# Patient Record
(demographics unavailable — no encounter records)

---

## 2024-12-02 NOTE — REASON FOR VISIT
[Initial Visit] : an initial visit for [Other: ______] : provided by ADRIÁN [Time Spent: ____ minutes] : Total time spent using  services: [unfilled] minutes. The patient's primary language is not English thus required  services. [FreeTextEntry2] : Left shoulder pain  [Interpreters_IDNumber] : [id number] [Interpreters_FullName] : Stefan [TWNoteComboBox1] : Tuvaluan

## 2024-12-02 NOTE — HISTORY OF PRESENT ILLNESS
[de-identified] : 12/2/2024: Elmira is a pleasant 45-year-old right-hand-dominant Persian-speaking female presents the office today for evaluation of left shoulder pain.  The patient states that her pain began about 10 months ago but she denies a history of injury.  The pain is activity related relieved by rest.  Hurts to reach overhead or behind.  Bothers her the lateral side at night.  She has not had any formal treatment before.  The patient denies any fevers, chills, sweats, recent illnesses, numbness, tingling, weakness, or pain elsewhere at this time.

## 2024-12-02 NOTE — DISCUSSION/SUMMARY
[de-identified] : Assessment: 45-year-old female with left shoulder pain secondary to impingement and rotator cuff tendinitis  Plan: I had a long discussion with the patient today regarding the nature of their diagnosis and treatment plan. We discussed the risks and benefits of no treatment as well as nonoperative and operative treatments.  I reviewed the patient's x-rays today with her in the office which demonstrate a type III acromion in the absence of any acute pathology.  At this time I recommend conservative treatment of the patient's condition with modalities including rest, ice, heat, anti-inflammatory medications, activity modifications, and home stretching and strengthening exercises.  A prescription for meloxicam was sent to the patient's pharmacy today.  I discussed with the patient the risks and benefits associated with NSAID use. GI precautions were discussed.  A referral for physical therapy was provided to begin working on exercises to help improve their strength and function.  Recommend follow-up in 8 weeks for repeat clinical assessment.  If symptoms persist we may consider an MRI versus an injection.  The patient verbalizes their understanding and agrees with the plan.  All questions were answered to their satisfaction.   I, Dr. Cook, personally performed the evaluation and management (E/M) services for this new patient.  That E/M includes conducting the clinically appropriate initial history &/or exam, assessing all conditions, and establishing the plan of care.  Today, my TAMELA, was here to observe my evaluation and management service for this patient & follow plan of care established by me going forward.

## 2024-12-02 NOTE — PHYSICAL EXAM
[de-identified] : General: Awake, alert, no acute distress, Patient was cooperative and appropriate during the examination.  Walks without an antalgic gait.   Left shoulder Exam: Physical exam of the shoulder demonstrates normal skin without signs of skin changes or abnormalities. No erythema, warmth, or joint effusion appreciated.  Sensation intact light touch C5-T1 Palpable radial pulse Radial/ulnar/median/axillary/musculocutaneous/AIN/PIN nerves grossly intact  Range of motion: Forward Flexion: 175 Abduction: 140 External Rotation: 45 Internal Rotation: L3  Palpation: Not tender to palpation over the glenohumeral joint Moderately tender palpation over the rotator cuff insertion on the greater tuberosity Not tender to palpation over the AC joint Mildly tender to palpation of the biceps tendon/bicipital groove  Strength testing: Supraspinatus: 5/5 Infraspinatus: 5/5 Subscapularis: 5/5  Special test: Empty can test positive Holland impingement test positive Speeds test negative Trumbull's test negative Lift-off test negative Bell-press test negative Cross-arm adduction test negative   [de-identified] : X-rays including 4 views of the left shoulder were obtained in the office on 12/2/2024 and reviewed with the patient.  No acute fracture or dislocation.  No significant arthritis.  Type III acromion.

## 2025-01-07 NOTE — ASSESSMENT
[FreeTextEntry1] : 45-year-old female with lumbar spondylosis lumbar radiculopathy   Medrol Dosepak Gabapentin 300 mg at nighttime Physical therapy*'s  service referral was placed to assist with scheduling location I will see her back in 6 weeks if her symptoms not improved we will move forward a lumbar MRI

## 2025-01-07 NOTE — HISTORY OF PRESENT ILLNESS
[de-identified] : Chief Complaint: Right-sided low back and right leg pain   History of Present Illness: 45-year-old female Maltese-speaking  is used today to assist interpretation she is coming in complaining of 6-month history of on and off right-sided low back and right leg pain.  She states it came on insidiously she having severe stabbing pain in the right side of her low back will radiate down the posterior aspect of her leg down to the foot and L5/S1 distribution.  She states the pain down the leg is intermittent and the pain in the back is intermittent she gets a flareup every 2 to 3 weeks she is currently having severe pain she states 10 out of 10 in severity.  She has difficulty and exacerbation of her pain with increased activity walking and lifting.  She denies any numbness or tingling.  She states the pain is keeping her up at nighttime.  She has tried some Advil with limited relief of her symptoms.   Past medical history, past surgical history, medications, allergies, social history, and family history are as documented in our records today.  Notable items include: None   Review of Systems: I have reviewed the patient's documented Review of Systems data today, I concur with this documentation.

## 2025-01-07 NOTE — PHYSICAL EXAM
[de-identified] : CONSTITUTIONAL: Patient is a very pleasant individual who is well-nourished and appears stated age. PSYCHIATRIC: Alert and oriented times three and in no apparent distress, and participates with orthopedic evaluation well. HEAD: Atraumatic and nonsyndromic in appearance. EENT: No thyromegaly, EOMI. RESPIRATORY: Respiratory rate is regular, not dyspneic on examination. LYMPHATICS: There is no cervical or axillary lymphadenopathy. INTEGUMENTARY: Skin is clean, dry, and intact to bilateral lower extremities. VASCULAR: There is brisk capillary refill about the bilateral Lower Extremities with 2+ DP Pulse  Palpation: There is significant paraspinal tenderness right side lumbosacral spine No pain with internal/external range of motion bilateral hips  Muscle Strength Testing: Hip Flexion: 5/5 B/L Knee Extension: 5/5 B/L Knee Flexion: 5/5 B/L Dorsiflexion: 5/5 B/L EHL: 5/5 B/L Plantarflexion: 5/5 B/L  Sensation: SILT L2-S1 B/L except: None  Reflexes: 2+ Quadriceps/Achilles  Gait: Normal gait w/o assistance Able to perform tandem gait Able to Heel Walk Able to Toe Walk  Special Testing:  Grossly positive straight leg raise test right lower extremity Negative clonus BLLE  [de-identified] : Standing AP, lateral, flexion and extension radiographs of the lumbar spine performed on 1/7/2025 in the Radiology Department at [facility] [place] for the indication of low back pain are reviewed.  These studies demonstrate no deformity on AP film no osteoarthritis bilateral hips Lateral radiographs demonstrates transitional anatomy rudimentary disc S1-S2 there is signs of mild spondylosis and disc height loss at L5-S1

## 2025-04-14 NOTE — REASON FOR VISIT
[Family Member] : family member [Initial Visit] : an initial visit for [Other: ______] : provided by ADRIÁN [FreeTextEntry2] : Left shoulder pain  [Interpreters_IDNumber] : [id number] [Interpreters_FullName] : Stefan [TWNoteComboBox1] : Vietnamese

## 2025-04-14 NOTE — DISCUSSION/SUMMARY
[de-identified] : Assessment: 45-year-old female with bilateral shoulder pain secondary to impingement and rotator cuff tendinitis, left worse than right  Plan: I had a long discussion with the patient today regarding the nature of their diagnosis and treatment plan. We discussed the risks and benefits of no treatment as well as nonoperative and operative treatments.  I reviewed the patient's x-rays today with her in the office which demonstrate a type III acromion in the absence of any acute pathology.  At this time I recommend continued conservative treatment of the patient's condition with modalities including rest, ice, heat, anti-inflammatory medications, activity modifications, and home stretching and strengthening exercises.  A prescription for nabumetone to be taken twice daily with food for pain inflammation was sent to the pharmacy. The risks and benefits associated with NSAID use. GI precautions were discussed.  Also due to the lack of improvement with conservative treatment including physical therapy, anti-inflammatories over the last several months I am recommending an MRI of the left shoulder for further evaluation.  She will follow-up after the MRI is complete for repeat evaluation and further discussion.  We may consider injection versus surgery pending the results.  We also may consider further imaging such as x-ray or MRI of the right if symptoms were to persist despite continued conservative treatment.  The patient verbalizes their understanding and agrees with the plan.  All questions were answered to their satisfaction.   I, Dr. Cook, personally performed the evaluation and management (E/M) services for this established patient who presents today with (a) new problem(s)/exacerbation of (an) existing condition(s).  That E/M includes conducting the clinically appropriate interval history &/or exam, assessing all new/exacerbated conditions, and establishing a new plan of care.  Today, my TAMELA, was here to observe my evaluation and management service for this new problem/exacerbated condition and follow the plan of care established by me going forward.

## 2025-04-14 NOTE — HISTORY OF PRESENT ILLNESS
[de-identified] : 04/14/2025 : ELMIRA ALEX  is a 45 year  old female who presents to the office for follow-up evaluation of her left shoulder.  She states she still having a little bit of pain in her right shoulder now as well that she feels is from compensating.  She states it is with certain motions and activities and she is taken medication and done therapy without any good relief.  She is here for specialist opinion.  She denies any numbness or tingling distally.  She has no other complaints today.  12/2/2024: Elmira is a pleasant 45-year-old right-hand-dominant Mongolian-speaking female presents the office today for evaluation of left shoulder pain.  The patient states that her pain began about 10 months ago but she denies a history of injury.  The pain is activity related relieved by rest.  Hurts to reach overhead or behind.  Bothers her the lateral side at night.  She has not had any formal treatment before.  The patient denies any fevers, chills, sweats, recent illnesses, numbness, tingling, weakness, or pain elsewhere at this time.

## 2025-04-14 NOTE — PHYSICAL EXAM
[de-identified] : General: Awake, alert, no acute distress, Patient was cooperative and appropriate during the examination.  Walks without an antalgic gait.   Bilateral shoulder Exam: Physical exam of the shoulder demonstrates normal skin without signs of skin changes or abnormalities. No erythema, warmth, or joint effusion appreciated.  Sensation intact light touch C5-T1 Palpable radial pulse Radial/ulnar/median/axillary/musculocutaneous/AIN/PIN nerves grossly intact  Range of motion: Forward Flexion: 175 Abduction: 140 External Rotation: 45 Internal Rotation: L3  Palpation: Not tender to palpation over the glenohumeral joint Moderately tender palpation over the rotator cuff insertion on the greater tuberosity Not tender to palpation over the AC joint Mildly tender to palpation of the biceps tendon/bicipital groove  Strength testing: Supraspinatus: 5/5 Infraspinatus: 5/5 Subscapularis: 5/5  Special test: Empty can test positive Holland impingement test positive Speeds test negative Vian's test negative Lift-off test negative Bell-press test negative Cross-arm adduction test negative   [de-identified] : X-rays including 4 views of the left shoulder were obtained in the office on 12/2/2024 and reviewed with the patient.  No acute fracture or dislocation.  No significant arthritis.  Type III acromion.

## 2025-05-21 NOTE — HISTORY OF PRESENT ILLNESS
[de-identified] : 05/21/2025 : ELMIRA ALEX  is a 45 year  old female who presents to the office for follow-up evaluation of the left shoulder.  She states that since her last office visit her symptoms are similar and she had the MRI completed and is here to discuss the results and options.  She denies any numbness or tingling distally.  She has no other complaints today.  04/14/2025 : ELMIRA ALEX  is a 45 year  old female who presents to the office for follow-up evaluation of her left shoulder.  She states she still having a little bit of pain in her right shoulder now as well that she feels is from compensating.  She states it is with certain motions and activities and she is taken medication and done therapy without any good relief.  She is here for specialist opinion.  She denies any numbness or tingling distally.  She has no other complaints today.  12/2/2024: Elmira is a pleasant 45-year-old right-hand-dominant Slovak-speaking female presents the office today for evaluation of left shoulder pain.  The patient states that her pain began about 10 months ago but she denies a history of injury.  The pain is activity related relieved by rest.  Hurts to reach overhead or behind.  Bothers her the lateral side at night.  She has not had any formal treatment before.  The patient denies any fevers, chills, sweats, recent illnesses, numbness, tingling, weakness, or pain elsewhere at this time.

## 2025-05-21 NOTE — PROCEDURE
[de-identified] : I injected the patient's left shoulder today with cortisone.   I discussed at length with the patient the planned steroid and lidocaine injection. The risks, benefits, convalescence and alternatives were reviewed. The possible side effects discussed included but were not limited to: pain, swelling, heat, bleeding, and redness. Symptoms are generally mild but if they are extensive then contact the office. Giving pain relievers by mouth such as NSAIDs or Tylenol can generally treat the reactions to steroid and lidocaine. Rare cases of infection have been noted. Rash, hives and itching may occur post injection. If you have muscle pain or cramps, flushing and or swelling of the face, rapid heart beat, nausea, dizziness, fever, chills, headache, difficulty breathing, swelling in the arms or legs, or have a prickly feeling of your skin, contact a health care provider immediately. Following this discussion, the shoulder was prepped with Chlorhexidine and Alcohol and under sterile conditions the 80 mg Depo-Medrol and 4 cc Lidocaine injection was performed with a 22 gauge needle through a posterolateral injection site. The needle was introduced into the subacromial space and the medication was injected. Upon withdrawal of the needle the site was cleaned with alcohol and a band aid was applied. The patient tolerated the injection well and there were no adverse effects. Post injection instructions included no strenuous activity for 24 hours, cryotherapy and if there are any adverse effects to contact the office.

## 2025-05-21 NOTE — REASON FOR VISIT
[Initial Visit] : an initial visit for [Family Member] : family member [Other: ______] : provided by ADRIÁN [FreeTextEntry2] : Left shoulder pain  [Interpreters_IDNumber] : [id number] [Interpreters_FullName] : Stefan [TWNoteComboBox1] : Welsh

## 2025-05-21 NOTE — DISCUSSION/SUMMARY
[de-identified] : Assessment: 45-year-old female with bilateral shoulder pain secondary to impingement and rotator cuff tendinitis, left worse than right  Plan: I had a long discussion with the patient today regarding the nature of their diagnosis and treatment plan. We discussed the risks and benefits of no treatment as well as nonoperative and operative treatments.  I reviewed the patient's x-rays today with her in the office which demonstrate a type III acromion in the absence of any acute pathology.  I reviewed the MRI that revealed tendinitis and bursitis and no other acute findings.  On examination today she still has pain in this region.  This time I am recommending that we continue with conservative treatment including ice, heat, rest, over-the-counter anti-inflammatories, physical therapy for symptomatic relief.  GI precautions were discussed and prescriptions were provided today.  She will avoid exacerbating activities and I am recommending a subacromial injection be administered in the office today.  She tolerated the procedure well with no adverse effects.  She will follow-up in 6 to 8 weeks for repeat evaluation as needed.  The patient verbalizes their understanding and agrees with the plan.  All questions were answered to their satisfaction.   I, Dr. Cook, personally performed the evaluation and management (E/M) services for this established patient who presents today with (a) new problem(s)/exacerbation of (an) existing condition(s).  That E/M includes conducting the clinically appropriate interval history &/or exam, assessing all new/exacerbated conditions, and establishing a new plan of care.  Today, my TAMELA, was here to observe my evaluation and management service for this new problem/exacerbated condition and follow the plan of care established by me going forward.

## 2025-05-21 NOTE — PHYSICAL EXAM
[de-identified] : General: Awake, alert, no acute distress, Patient was cooperative and appropriate during the examination.  Walks without an antalgic gait.   Bilateral shoulder Exam: Physical exam of the shoulder demonstrates normal skin without signs of skin changes or abnormalities. No erythema, warmth, or joint effusion appreciated.  Sensation intact light touch C5-T1 Palpable radial pulse Radial/ulnar/median/axillary/musculocutaneous/AIN/PIN nerves grossly intact  Range of motion: Forward Flexion: 175 Abduction: 140 External Rotation: 45 Internal Rotation: L3  Palpation: Not tender to palpation over the glenohumeral joint Moderately tender palpation over the rotator cuff insertion on the greater tuberosity Not tender to palpation over the AC joint Mildly tender to palpation of the biceps tendon/bicipital groove  Strength testing: Supraspinatus: 5/5 Infraspinatus: 5/5 Subscapularis: 5/5  Special test: Empty can test positive Holland impingement test positive Speeds test negative Viola's test negative Lift-off test negative Bell-press test negative Cross-arm adduction test negative   [de-identified] : MRI taken at a [facility] imaging facility on April 25, 2025 revealed mild to moderate subacromial/subdeltoid bursitis with left proximal biceps tenosynovitis with rotator cuff tendinosis  X-rays including 4 views of the left shoulder were obtained in the office on 12/2/2024 and reviewed with the patient.  No acute fracture or dislocation.  No significant arthritis.  Type III acromion.

## 2025-07-21 NOTE — REASON FOR VISIT
[Family Member] : family member [Initial Visit] : an initial visit for [Other: ______] : provided by ADRIÁN [FreeTextEntry2] : Left shoulder pain  [Interpreters_IDNumber] : [id number] [Interpreters_FullName] : Stefan [TWNoteComboBox1] : [place] Islander

## 2025-07-21 NOTE — HISTORY OF PRESENT ILLNESS
[de-identified] : 7/21/2025: Elmira is a 45-year-old female who returns to the office today for follow-up evaluation of the left shoulder.  Patient states that her left shoulder is feeling much better having had a cortisone injection her last appointment.  However, her right shoulder is now bothering her and she is requesting a cortisone injection for this.  The patient denies any fevers, chills, sweats, recent illnesses, numbness, tingling, weakness, or pain elsewhere at this time.  05/21/2025 : ELMIRA ALEX  is a 45 year  old female who presents to the office for follow-up evaluation of the left shoulder.  She states that since her last office visit her symptoms are similar and she had the MRI completed and is here to discuss the results and options.  She denies any numbness or tingling distally.  She has no other complaints today.  04/14/2025 : ELMIRA ALEX  is a 45 year  old female who presents to the office for follow-up evaluation of her left shoulder.  She states she still having a little bit of pain in her right shoulder now as well that she feels is from compensating.  She states it is with certain motions and activities and she is taken medication and done therapy without any good relief.  She is here for specialist opinion.  She denies any numbness or tingling distally.  She has no other complaints today.  12/2/2024: Elmira is a pleasant 45-year-old right-hand-dominant Arabic-speaking female presents the office today for evaluation of left shoulder pain.  The patient states that her pain began about 10 months ago but she denies a history of injury.  The pain is activity related relieved by rest.  Hurts to reach overhead or behind.  Bothers her the lateral side at night.  She has not had any formal treatment before.  The patient denies any fevers, chills, sweats, recent illnesses, numbness, tingling, weakness, or pain elsewhere at this time.

## 2025-07-21 NOTE — PROCEDURE
[de-identified] : I injected the patient's right great shoulder today with cortisone.   I discussed at length with the patient the planned steroid and lidocaine injection. The risks, benefits, convalescence and alternatives were reviewed. The possible side effects discussed included but were not limited to: pain, swelling, heat, bleeding, and redness. Symptoms are generally mild but if they are extensive then contact the office. Giving pain relievers by mouth such as NSAIDs or Tylenol can generally treat the reactions to steroid and lidocaine. Rare cases of infection have been noted. Rash, hives and itching may occur post injection. If you have muscle pain or cramps, flushing and or swelling of the face, rapid heart beat, nausea, dizziness, fever, chills, headache, difficulty breathing, swelling in the arms or legs, or have a prickly feeling of your skin, contact a health care provider immediately. Following this discussion, the shoulder was prepped with Chlorhexidine and Alcohol and under sterile conditions the 80 mg Depo-Medrol and 4 cc Lidocaine injection was performed with a 22 gauge needle through a posterolateral injection site. The needle was introduced into the subacromial space and the medication was injected. Upon withdrawal of the needle the site was cleaned with alcohol and a band aid was applied. The patient tolerated the injection well and there were no adverse effects. Post injection instructions included no strenuous activity for 24 hours, cryotherapy and if there are any adverse effects to contact the office.

## 2025-07-21 NOTE — HISTORY OF PRESENT ILLNESS
[de-identified] : 7/21/2025: Elmira is a 45-year-old female who returns to the office today for follow-up evaluation of the left shoulder.  Patient states that her left shoulder is feeling much better having had a cortisone injection her last appointment.  However, her right shoulder is now bothering her and she is requesting a cortisone injection for this.  The patient denies any fevers, chills, sweats, recent illnesses, numbness, tingling, weakness, or pain elsewhere at this time.  05/21/2025 : ELMIRA ALEX  is a 45 year  old female who presents to the office for follow-up evaluation of the left shoulder.  She states that since her last office visit her symptoms are similar and she had the MRI completed and is here to discuss the results and options.  She denies any numbness or tingling distally.  She has no other complaints today.  04/14/2025 : ELMIRA ALEX  is a 45 year  old female who presents to the office for follow-up evaluation of her left shoulder.  She states she still having a little bit of pain in her right shoulder now as well that she feels is from compensating.  She states it is with certain motions and activities and she is taken medication and done therapy without any good relief.  She is here for specialist opinion.  She denies any numbness or tingling distally.  She has no other complaints today.  12/2/2024: Elmira is a pleasant 45-year-old right-hand-dominant [place]yrgyz-speaking female presents the office today for evaluation of left shoulder pain.  The patient states that her pain began about 10 months ago but she denies a history of injury.  The pain is activity related relieved by rest.  Hurts to reach overhead or behind.  Bothers her the lateral side at night.  She has not had any formal treatment before.  The patient denies any fevers, chills, sweats, recent illnesses, numbness, tingling, weakness, or pain elsewhere at this time.

## 2025-07-21 NOTE — DISCUSSION/SUMMARY
[de-identified] : Assessment: 45-year-old female with bilateral shoulder pain secondary to impingement and rotator cuff tendinitis, right worse than left  Plan: I had a long discussion with the patient today regarding the nature of their diagnosis and treatment plan. We discussed the risks and benefits of no treatment as well as nonoperative and operative treatments.  Reviewed the patient's x-rays and imaging today with her in the office which are negative for any acute pathology.  Her left shoulder is doing well.  Examination of her right shoulder she has good range of motion and strength with positive impingement signs.  At this time I recommend conservative treatment of the patient's condition with modalities including rest, ice, heat, anti-inflammatory medications, activity modifications, and home stretching and strengthening exercises. I discussed with the patient the risks and benefits associated with NSAID use. GI precautions were discussed.  A cortisone injection was administered to the patient's right shoulder today in the office.  The patient tolerated this well and there were no adverse effects.  She will follow-up in 8 weeks repeat clinical assessment as needed.  If symptoms persist in the right shoulder we will consider an MRI.  The patient verbalizes their understanding and agrees with the plan.  All questions were answered to their satisfaction.   I, Dr. Cook, personally performed the evaluation and management (E/M) services for this established patient who presents today with (a) new problem(s)/exacerbation of (an) existing condition(s).  That E/M includes conducting the clinically appropriate interval history &/or exam, assessing all new/exacerbated conditions, and establishing a new plan of care.  Today, my TAMELA, was here to observe my evaluation and management service for this new problem/exacerbated condition and follow the plan of care established by me going forward.

## 2025-07-21 NOTE — PROCEDURE
[de-identified] : I injected the patient's right great shoulder today with cortisone.   I discussed at length with the patient the planned steroid and lidocaine injection. The risks, benefits, convalescence and alternatives were reviewed. The possible side effects discussed included but were not limited to: pain, swelling, heat, bleeding, and redness. Symptoms are generally mild but if they are extensive then contact the office. Giving pain relievers by mouth such as NSAIDs or Tylenol can generally treat the reactions to steroid and lidocaine. Rare cases of infection have been noted. Rash, hives and itching may occur post injection. If you have muscle pain or cramps, flushing and or swelling of the face, rapid heart beat, nausea, dizziness, fever, chills, headache, difficulty breathing, swelling in the arms or legs, or have a prickly feeling of your skin, contact a health care provider immediately. Following this discussion, the shoulder was prepped with Chlorhexidine and Alcohol and under sterile conditions the 80 mg Depo-Medrol and 4 cc Lidocaine injection was performed with a 22 gauge needle through a posterolateral injection site. The needle was introduced into the subacromial space and the medication was injected. Upon withdrawal of the needle the site was cleaned with alcohol and a band aid was applied. The patient tolerated the injection well and there were no adverse effects. Post injection instructions included no strenuous activity for 24 hours, cryotherapy and if there are any adverse effects to contact the office.

## 2025-07-21 NOTE — DISCUSSION/SUMMARY
[de-identified] : Assessment: 45-year-old female with bilateral shoulder pain secondary to impingement and rotator cuff tendinitis, right worse than left  Plan: I had a long discussion with the patient today regarding the nature of their diagnosis and treatment plan. We discussed the risks and benefits of no treatment as well as nonoperative and operative treatments.  Reviewed the patient's x-rays and imaging today with her in the office which are negative for any acute pathology.  Her left shoulder is doing well.  Examination of her right shoulder she has good range of motion and strength with positive impingement signs.  At this time I recommend conservative treatment of the patient's condition with modalities including rest, ice, heat, anti-inflammatory medications, activity modifications, and home stretching and strengthening exercises. I discussed with the patient the risks and benefits associated with NSAID use. GI precautions were discussed.  A cortisone injection was administered to the patient's right shoulder today in the office.  The patient tolerated this well and there were no adverse effects.  She will follow-up in 8 weeks repeat clinical assessment as needed.  If symptoms persist in the right shoulder we will consider an MRI.  The patient verbalizes their understanding and agrees with the plan.  All questions were answered to their satisfaction.   I, Dr. Cook, personally performed the evaluation and management (E/M) services for this established patient who presents today with (a) new problem(s)/exacerbation of (an) existing condition(s).  That E/M includes conducting the clinically appropriate interval history &/or exam, assessing all new/exacerbated conditions, and establishing a new plan of care.  Today, my TAMELA, was here to observe my evaluation and management service for this new problem/exacerbated condition and follow the plan of care established by me going forward.

## 2025-07-21 NOTE — REASON FOR VISIT
[Family Member] : family member [Initial Visit] : an initial visit for [Other: ______] : provided by ADRIÁN [FreeTextEntry2] : Left shoulder pain  [Interpreters_IDNumber] : [id number] [Interpreters_FullName] : Stefan [[id number]] : [place]

## 2025-07-21 NOTE — PHYSICAL EXAM
[de-identified] : General: Awake, alert, no acute distress, Patient was cooperative and appropriate during the examination.  Walks without an antalgic gait.   Bilateral shoulder Exam: Physical exam of the shoulder demonstrates normal skin without signs of skin changes or abnormalities. No erythema, warmth, or joint effusion appreciated.  Sensation intact light touch C5-T1 Palpable radial pulse Radial/ulnar/median/axillary/musculocutaneous/AIN/PIN nerves grossly intact  Range of motion: Forward Flexion: 175 Abduction: 140 External Rotation: 45 Internal Rotation: L3  Palpation: Not tender to palpation over the glenohumeral joint Moderately tender palpation over the rotator cuff insertion on the greater tuberosity on the right Not tender to palpation over the AC joint Mildly tender to palpation of the biceps tendon/bicipital groove  Strength testing: Supraspinatus: 5/5 Infraspinatus: 5/5 Subscapularis: 5/5  Special test: Empty can test positive on the right Holland impingement test positive on the right Speeds test negative Fayette's test negative Lift-off test negative Bell-press test negative Cross-arm adduction test negative   [de-identified] : MRI taken at a [facility] imaging facility on April 25, 2025 revealed mild to moderate subacromial/subdeltoid bursitis with left proximal biceps tenosynovitis with rotator cuff tendinosis  X-rays including 4 views of the left shoulder were obtained in the office on 12/2/2024 and reviewed with the patient.  No acute fracture or dislocation.  No significant arthritis.  Type III acromion.  X-rays including 4 views of the right shoulder from our office on 4/14/2025 reviewed today.  No acute fracture or dislocation.  No significant arthritis.

## 2025-07-21 NOTE — PHYSICAL EXAM
[de-identified] : General: Awake, alert, no acute distress, Patient was cooperative and appropriate during the examination.  Walks without an antalgic gait.   Bilateral shoulder Exam: Physical exam of the shoulder demonstrates normal skin without signs of skin changes or abnormalities. No erythema, warmth, or joint effusion appreciated.  Sensation intact light touch C5-T1 Palpable radial pulse Radial/ulnar/median/axillary/musculocutaneous/AIN/PIN nerves grossly intact  Range of motion: Forward Flexion: 175 Abduction: 140 External Rotation: 45 Internal Rotation: L3  Palpation: Not tender to palpation over the glenohumeral joint Moderately tender palpation over the rotator cuff insertion on the greater tuberosity on the right Not tender to palpation over the AC joint Mildly tender to palpation of the biceps tendon/bicipital groove  Strength testing: Supraspinatus: 5/5 Infraspinatus: 5/5 Subscapularis: 5/5  Special test: Empty can test positive on the right Holland impingement test positive on the right Speeds test negative Tucker's test negative Lift-off test negative Bell-press test negative Cross-arm adduction test negative   [de-identified] : MRI taken at a [facility] imaging facility on April 25, 2025 revealed mild to moderate subacromial/subdeltoid bursitis with left proximal biceps tenosynovitis with rotator cuff tendinosis  X-rays including 4 views of the left shoulder were obtained in the office on 12/2/2024 and reviewed with the patient.  No acute fracture or dislocation.  No significant arthritis.  Type III acromion.  X-rays including 4 views of the right shoulder from our office on 4/14/2025 reviewed today.  No acute fracture or dislocation.  No significant arthritis.